# Patient Record
Sex: FEMALE | URBAN - METROPOLITAN AREA
[De-identification: names, ages, dates, MRNs, and addresses within clinical notes are randomized per-mention and may not be internally consistent; named-entity substitution may affect disease eponyms.]

---

## 2018-05-14 ENCOUNTER — APPOINTMENT (RX ONLY)
Dept: URBAN - METROPOLITAN AREA CLINIC 20 | Facility: CLINIC | Age: 73
Setting detail: DERMATOLOGY
End: 2018-05-14

## 2018-05-14 DIAGNOSIS — L57.0 ACTINIC KERATOSIS: ICD-10-CM

## 2018-05-14 DIAGNOSIS — F42.4 EXCORIATION (SKIN-PICKING) DISORDER: ICD-10-CM

## 2018-05-14 DIAGNOSIS — L20.89 OTHER ATOPIC DERMATITIS: ICD-10-CM

## 2018-05-14 PROBLEM — T14.8XXA OTHER INJURY OF UNSPECIFIED BODY REGION, INITIAL ENCOUNTER: Status: ACTIVE | Noted: 2018-05-14

## 2018-05-14 PROBLEM — L20.84 INTRINSIC (ALLERGIC) ECZEMA: Status: ACTIVE | Noted: 2018-05-14

## 2018-05-14 PROCEDURE — ? PRESCRIPTION

## 2018-05-14 PROCEDURE — 99202 OFFICE O/P NEW SF 15 MIN: CPT | Mod: 25

## 2018-05-14 PROCEDURE — ? COUNSELING

## 2018-05-14 PROCEDURE — 17003 DESTRUCT PREMALG LES 2-14: CPT

## 2018-05-14 PROCEDURE — 17000 DESTRUCT PREMALG LESION: CPT

## 2018-05-14 PROCEDURE — ? LIQUID NITROGEN

## 2018-05-14 RX ORDER — CERAMIDES 1,3,6-II
CREAM (GRAM) TOPICAL
Qty: 1 | Refills: 11

## 2018-05-14 ASSESSMENT — LOCATION ZONE DERM
LOCATION ZONE: SCALP
LOCATION ZONE: HAND
LOCATION ZONE: FACE
LOCATION ZONE: ARM
LOCATION ZONE: LEG
LOCATION ZONE: TRUNK

## 2018-05-14 ASSESSMENT — LOCATION SIMPLE DESCRIPTION DERM
LOCATION SIMPLE: RIGHT HAND
LOCATION SIMPLE: CHEST
LOCATION SIMPLE: HAIR
LOCATION SIMPLE: RIGHT FOREHEAD
LOCATION SIMPLE: LEFT FOREHEAD
LOCATION SIMPLE: LEFT KNEE
LOCATION SIMPLE: RIGHT PRETIBIAL REGION
LOCATION SIMPLE: LEFT SHOULDER

## 2018-05-14 ASSESSMENT — LOCATION DETAILED DESCRIPTION DERM
LOCATION DETAILED: UPPER STERNUM
LOCATION DETAILED: LEFT POSTERIOR SHOULDER
LOCATION DETAILED: HAIR
LOCATION DETAILED: LEFT KNEE
LOCATION DETAILED: RIGHT RADIAL DORSAL HAND
LOCATION DETAILED: RIGHT LATERAL PROXIMAL PRETIBIAL REGION
LOCATION DETAILED: LEFT INFERIOR LATERAL FOREHEAD
LOCATION DETAILED: LEFT INFERIOR MEDIAL FOREHEAD
LOCATION DETAILED: RIGHT FOREHEAD

## 2018-05-14 NOTE — PROCEDURE: COUNSELING
Patient Specific Counseling (Will Not Stick From Patient To Patient): Only froze a few today to see how she tolerates it.
Detail Level: Generalized
Detail Level: Zone
Patient Specific Counseling (Will Not Stick From Patient To Patient): Pt does not want to use Rx medications to treat this if she can help it.  Keep superficial BCC in mind for L shoulder area if not improved.  Only began to itch in the last week or so.

## 2018-05-14 NOTE — PROCEDURE: LIQUID NITROGEN
Consent: The patient's consent was obtained including but not limited to risks of crusting, scabbing, blistering, scarring, darker or lighter pigmentary change, recurrence, incomplete removal and infection.
Render Post-Care Instructions In Note?: yes
Detail Level: Detailed
Post-Care Instructions: I reviewed with the patient in detail post-care instructions. Patient is to wear sun protection and avoid picking at any of the treated lesions. Pt may apply Vaseline to crusted or scabbing areas.
Duration Of Freeze Thaw-Cycle (Seconds): 0

## 2018-06-04 ENCOUNTER — APPOINTMENT (RX ONLY)
Dept: URBAN - METROPOLITAN AREA CLINIC 20 | Facility: CLINIC | Age: 73
Setting detail: DERMATOLOGY
End: 2018-06-04

## 2018-06-04 DIAGNOSIS — L20.89 OTHER ATOPIC DERMATITIS: ICD-10-CM

## 2018-06-04 DIAGNOSIS — L57.0 ACTINIC KERATOSIS: ICD-10-CM

## 2018-06-04 PROBLEM — L20.84 INTRINSIC (ALLERGIC) ECZEMA: Status: ACTIVE | Noted: 2018-06-04

## 2018-06-04 PROCEDURE — 17004 DESTROY PREMAL LESIONS 15/>: CPT

## 2018-06-04 PROCEDURE — ? LIQUID NITROGEN

## 2018-06-04 PROCEDURE — 99213 OFFICE O/P EST LOW 20 MIN: CPT | Mod: 25

## 2018-06-04 PROCEDURE — ? PRESCRIPTION

## 2018-06-04 PROCEDURE — ? COUNSELING

## 2018-06-04 RX ORDER — CERAMIDES 1,3,6-II
CREAM (GRAM) TOPICAL
Qty: 1 | Refills: 11

## 2018-06-04 RX ORDER — TRIAMCINOLONE ACETONIDE 1 MG/G
CREAM TOPICAL
Qty: 1 | Refills: 1

## 2018-06-04 ASSESSMENT — LOCATION SIMPLE DESCRIPTION DERM
LOCATION SIMPLE: LEFT HAND
LOCATION SIMPLE: RIGHT THUMB
LOCATION SIMPLE: RIGHT CHEEK
LOCATION SIMPLE: LEFT TEMPLE
LOCATION SIMPLE: LEFT ANTERIOR NECK
LOCATION SIMPLE: LEFT CHEEK
LOCATION SIMPLE: LEFT FOREARM
LOCATION SIMPLE: RIGHT WRIST
LOCATION SIMPLE: RIGHT CLAVICULAR SKIN
LOCATION SIMPLE: RIGHT HAND
LOCATION SIMPLE: LEFT INDEX FINGER
LOCATION SIMPLE: CHEST
LOCATION SIMPLE: RIGHT INDEX FINGER
LOCATION SIMPLE: LEFT SHOULDER

## 2018-06-04 ASSESSMENT — LOCATION DETAILED DESCRIPTION DERM
LOCATION DETAILED: LEFT CLAVICULAR NECK
LOCATION DETAILED: RIGHT CLAVICULAR SKIN
LOCATION DETAILED: LEFT CENTRAL MALAR CHEEK
LOCATION DETAILED: UPPER STERNUM
LOCATION DETAILED: RIGHT LATERAL MALAR CHEEK
LOCATION DETAILED: RIGHT VENTRAL WRIST
LOCATION DETAILED: LEFT ULNAR DORSAL HAND
LOCATION DETAILED: LEFT INFERIOR LATERAL NECK
LOCATION DETAILED: LEFT RADIAL DORSAL HAND
LOCATION DETAILED: RIGHT RADIAL DORSAL HAND
LOCATION DETAILED: RIGHT PROXIMAL DORSAL INDEX FINGER
LOCATION DETAILED: LEFT POSTERIOR SHOULDER
LOCATION DETAILED: LEFT INFERIOR TEMPLE
LOCATION DETAILED: LEFT DISTAL DORSAL FOREARM
LOCATION DETAILED: LEFT PROXIMAL DORSAL INDEX FINGER
LOCATION DETAILED: STERNAL NOTCH
LOCATION DETAILED: 1ST WEB SPACE RIGHT HAND

## 2018-06-04 ASSESSMENT — LOCATION ZONE DERM
LOCATION ZONE: TRUNK
LOCATION ZONE: FACE
LOCATION ZONE: ARM
LOCATION ZONE: HAND
LOCATION ZONE: NECK
LOCATION ZONE: FINGER

## 2018-06-04 NOTE — PROCEDURE: COUNSELING
Detail Level: Generalized
Patient Specific Counseling (Will Not Stick From Patient To Patient): L posterior shoulder area looks like eczema today not superficial BCC.  Continue Aveeno BID.
Detail Level: Zone

## 2018-06-04 NOTE — PROCEDURE: LIQUID NITROGEN
Render Post-Care Instructions In Note?: yes
Post-Care Instructions: I reviewed with the patient in detail post-care instructions. Patient is to wear sun protection and avoid picking at any of the treated lesions. Pt may apply Vaseline to crusted or scabbing areas.
Duration Of Freeze Thaw-Cycle (Seconds): 0
Consent: The patient's consent was obtained including but not limited to risks of crusting, scabbing, blistering, scarring, darker or lighter pigmentary change, recurrence, incomplete removal and infection.
Detail Level: Detailed

## 2018-07-13 ENCOUNTER — APPOINTMENT (RX ONLY)
Dept: URBAN - METROPOLITAN AREA CLINIC 20 | Facility: CLINIC | Age: 73
Setting detail: DERMATOLOGY
End: 2018-07-13

## 2018-07-13 DIAGNOSIS — L20.89 OTHER ATOPIC DERMATITIS: ICD-10-CM

## 2018-07-13 DIAGNOSIS — L57.0 ACTINIC KERATOSIS: ICD-10-CM

## 2018-07-13 DIAGNOSIS — L01.01 NON-BULLOUS IMPETIGO: ICD-10-CM

## 2018-07-13 PROBLEM — L20.84 INTRINSIC (ALLERGIC) ECZEMA: Status: ACTIVE | Noted: 2018-07-13

## 2018-07-13 PROCEDURE — 11101: CPT

## 2018-07-13 PROCEDURE — ? PRESCRIPTION MEDICATION MANAGEMENT

## 2018-07-13 PROCEDURE — ? PRESCRIPTION

## 2018-07-13 PROCEDURE — 99213 OFFICE O/P EST LOW 20 MIN: CPT | Mod: 25

## 2018-07-13 PROCEDURE — ? BIOPSY BY PUNCH METHOD

## 2018-07-13 PROCEDURE — ? COUNSELING

## 2018-07-13 PROCEDURE — 11100: CPT

## 2018-07-13 RX ORDER — MUPIROCIN 20 MG/G
OINTMENT TOPICAL
Qty: 1 | Refills: 1

## 2018-07-13 ASSESSMENT — LOCATION ZONE DERM
LOCATION ZONE: ARM
LOCATION ZONE: FACE
LOCATION ZONE: FACE
LOCATION ZONE: TRUNK

## 2018-07-13 ASSESSMENT — LOCATION DETAILED DESCRIPTION DERM
LOCATION DETAILED: UPPER STERNUM
LOCATION DETAILED: LEFT MEDIAL SUPERIOR CHEST
LOCATION DETAILED: LEFT MEDIAL FOREHEAD
LOCATION DETAILED: LEFT SUPERIOR MEDIAL BUCCAL CHEEK
LOCATION DETAILED: LEFT POSTERIOR SHOULDER

## 2018-07-13 ASSESSMENT — LOCATION SIMPLE DESCRIPTION DERM
LOCATION SIMPLE: LEFT SHOULDER
LOCATION SIMPLE: LEFT CHEEK
LOCATION SIMPLE: LEFT FOREHEAD
LOCATION SIMPLE: CHEST

## 2018-07-13 NOTE — PROCEDURE: PRESCRIPTION MEDICATION MANAGEMENT
Continue Regimen: triamcinolone acetonide 0.1 % topical cream TP Sig: Apply to eczema on body BID when flared
Detail Level: Zone

## 2018-07-13 NOTE — PROCEDURE: BIOPSY BY PUNCH METHOD
Anesthesia Type: 1% lidocaine with 1:100,000 epinephrine and a 1:10 solution of 8.4% sodium bicarbonate
Billing Type: Third-Party Bill
Dressing: bandage
Post-Care Instructions: Keep the biopsy site dry overnight, then keep the site clean by washing with soap and water twice daily then covering with Vaseline/Aquaphor and a Band-Aid until healed.
Render Post-Care Instructions In Note?: yes
Size Of Lesion In Cm (Optional): 0
Consent: Written consent was obtained and risks were reviewed including but not limited to scarring, infection, bleeding, scabbing, incomplete removal, nerve damage and allergy to anesthesia.
Notification Instructions: Patient will be notified of biopsy results; however, patient is instructed to call the office if not contacted within 2 weeks.
Detail Level: Detailed
Punch Size In Mm: 3.5
Patient Will Remove Sutures At Home?: No
Hemostasis: Electrocautery
Suture Removal: 14 days
Lab Facility: [id number]
Anesthesia Volume In Cc: 0.5
Home Suture Removal Text: Patient will come into the office for suture removal.
Wound Care: Aquaphor
Biopsy Type: H and E
Epidermal Sutures: 4-0 Monosof
Lab: [id number]

## 2018-07-13 NOTE — PROCEDURE: COUNSELING
Detail Level: Generalized
Detail Level: Zone
Patient Specific Counseling (Will Not Stick From Patient To Patient): Pt states that this itchy/painful rash has been present x 4 years.  Will biopsy to confirm dx since not improving.  Continue Eucerin BID.

## 2018-07-25 ENCOUNTER — APPOINTMENT (RX ONLY)
Dept: URBAN - METROPOLITAN AREA CLINIC 20 | Facility: CLINIC | Age: 73
Setting detail: DERMATOLOGY
End: 2018-07-25

## 2018-07-25 DIAGNOSIS — L01.01 NON-BULLOUS IMPETIGO: ICD-10-CM

## 2018-07-25 PROBLEM — D04.62 CARCINOMA IN SITU OF SKIN OF LEFT UPPER LIMB, INCLUDING SHOULDER: Status: ACTIVE | Noted: 2018-07-25

## 2018-07-25 PROBLEM — Z85.828 PERSONAL HISTORY OF OTHER MALIGNANT NEOPLASM OF SKIN: Status: ACTIVE | Noted: 2018-07-25

## 2018-07-25 PROBLEM — D04.5 CARCINOMA IN SITU OF SKIN OF TRUNK: Status: ACTIVE | Noted: 2018-07-25

## 2018-07-25 PROCEDURE — ? COUNSELING

## 2018-07-25 PROCEDURE — ? SUTURE REMOVAL (NO GLOBAL PERIOD)

## 2018-07-25 PROCEDURE — 99213 OFFICE O/P EST LOW 20 MIN: CPT

## 2018-07-25 PROCEDURE — ? PRESCRIPTION MEDICATION MANAGEMENT

## 2018-07-25 ASSESSMENT — LOCATION SIMPLE DESCRIPTION DERM
LOCATION SIMPLE: LEFT CHEEK
LOCATION SIMPLE: LEFT CHEEK

## 2018-07-25 ASSESSMENT — LOCATION DETAILED DESCRIPTION DERM
LOCATION DETAILED: LEFT SUPERIOR MEDIAL BUCCAL CHEEK
LOCATION DETAILED: LEFT INFERIOR MEDIAL MALAR CHEEK

## 2018-07-25 ASSESSMENT — LOCATION ZONE DERM
LOCATION ZONE: FACE
LOCATION ZONE: FACE

## 2018-07-25 NOTE — PROCEDURE: PRESCRIPTION MEDICATION MANAGEMENT
Discontinue Regimen: mupirocin 2 % topical ointment TP Sig: Apply to affected wounds on body BID until healed
Detail Level: Zone

## 2018-07-25 NOTE — PROCEDURE: COUNSELING
Detail Level: Detailed
Patient Specific Counseling (Will Not Stick From Patient To Patient): These 2 locations were previously called eczema and they are itchy and painful and have been present x 4 years.  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  Previously discussed result with Dr. Howell and due to the size of the chest site I offered 5-FU first but pt declined today since she doesn’t like to take medications.   I offered her the other treatments of C&D and excision and she prefers C&D and is ok with the scarring in those locations.
Detail Level: Zone

## 2018-08-08 ENCOUNTER — APPOINTMENT (RX ONLY)
Dept: URBAN - METROPOLITAN AREA CLINIC 20 | Facility: CLINIC | Age: 73
Setting detail: DERMATOLOGY
End: 2018-08-08

## 2018-08-08 PROBLEM — D04.62 CARCINOMA IN SITU OF SKIN OF LEFT UPPER LIMB, INCLUDING SHOULDER: Status: ACTIVE | Noted: 2018-08-08

## 2018-08-08 PROBLEM — D04.5 CARCINOMA IN SITU OF SKIN OF TRUNK: Status: ACTIVE | Noted: 2018-08-08

## 2018-08-08 PROCEDURE — 17266 DSTRJ MAL LES T/A/L >4.0 CM: CPT

## 2018-08-08 PROCEDURE — ? COUNSELING

## 2018-08-08 PROCEDURE — 17263 DSTRJ MAL LES T/A/L 2.1-3.0: CPT

## 2018-08-08 PROCEDURE — ? CURETTAGE AND DESTRUCTION

## 2018-08-08 NOTE — PROCEDURE: CURETTAGE AND DESTRUCTION
Detail Level: Detailed
Number Of Curettages: 3
Additional Information: (Optional): The wound was cleaned and a dressing was applied.
Concentration (Mg/Ml Or Millions Of Plaque Forming Units/Cc): 0.01
Add Ability To Document Additional Intralesional Injection: No
Post-Care Instructions: I reviewed with the patient in detail the post-care instructions. Patient is to keep the area dry for 24 hours.  Patent should keep the area clean with soap and water and apply Vaseline and a bandage BID until healed.  Should the patient develop any fevers, chills, bleeding or severe pain they will contact the office immediately.
Anesthesia Volume In Cc: 1
Render Post-Care Instructions In Note?: yes
Size Of Lesion After Curettage: 4.7
Anesthesia Type: 1% lidocaine with 1:100,000 epinephrine and 408mcg clindamycin/ml and a 1:10 solution of 8.4% sodium bicarbonate
Consent was obtained from the patient. The risks, benefits and alternatives to therapy were discussed in detail. Specifically, the risks of infection, scarring, bleeding, prolonged wound healing, nerve injury, incomplete removal, allergy to anesthesia and recurrence were addressed. Alternatives to ED&C were also discussed.  Prior to the procedure, the treatment site was clearly identified and confirmed by the patient. All components of Universal Protocol/PAUSE Rule completed.
Size Of Lesion In Cm: 3.9
Cautery Type: electrodesiccation
Bill As A Line Item Or As Units: Line Item
What Was Performed First?: Curettage
Size Of Lesion After Curettage: 2.6
Size Of Lesion In Cm: 1.8

## 2018-11-08 ENCOUNTER — APPOINTMENT (RX ONLY)
Dept: URBAN - METROPOLITAN AREA CLINIC 22 | Facility: CLINIC | Age: 73
Setting detail: DERMATOLOGY
End: 2018-11-08

## 2018-11-08 DIAGNOSIS — Z12.83 ENCOUNTER FOR SCREENING FOR MALIGNANT NEOPLASM OF SKIN: ICD-10-CM

## 2018-11-08 DIAGNOSIS — D18.0 HEMANGIOMA: ICD-10-CM

## 2018-11-08 DIAGNOSIS — L57.0 ACTINIC KERATOSIS: ICD-10-CM

## 2018-11-08 DIAGNOSIS — Z86.007 PERSONAL HISTORY OF IN-SITU NEOPLASM OF SKIN: ICD-10-CM

## 2018-11-08 DIAGNOSIS — L81.4 OTHER MELANIN HYPERPIGMENTATION: ICD-10-CM

## 2018-11-08 PROBLEM — D18.01 HEMANGIOMA OF SKIN AND SUBCUTANEOUS TISSUE: Status: ACTIVE | Noted: 2018-11-08

## 2018-11-08 PROBLEM — Z85.828 PERSONAL HISTORY OF OTHER MALIGNANT NEOPLASM OF SKIN: Status: ACTIVE | Noted: 2018-11-08

## 2018-11-08 PROCEDURE — 17004 DESTROY PREMAL LESIONS 15/>: CPT

## 2018-11-08 PROCEDURE — ? COUNSELING

## 2018-11-08 PROCEDURE — ? LIQUID NITROGEN

## 2018-11-08 PROCEDURE — 99214 OFFICE O/P EST MOD 30 MIN: CPT | Mod: 25

## 2018-11-08 ASSESSMENT — LOCATION DETAILED DESCRIPTION DERM
LOCATION DETAILED: RIGHT SUPERIOR HELIX
LOCATION DETAILED: SUPERIOR THORACIC SPINE
LOCATION DETAILED: LEFT INFERIOR LATERAL FOREHEAD
LOCATION DETAILED: LEFT MEDIAL FRONTAL SCALP
LOCATION DETAILED: LEFT MEDIAL BREAST 10-11:00 REGION
LOCATION DETAILED: RIGHT CENTRAL MALAR CHEEK
LOCATION DETAILED: RIGHT PROXIMAL POSTERIOR UPPER ARM
LOCATION DETAILED: RIGHT SUPERIOR LATERAL UPPER BACK
LOCATION DETAILED: RIGHT DISTAL DORSAL FOREARM
LOCATION DETAILED: LEFT ULNAR DORSAL HAND
LOCATION DETAILED: LEFT SUPERIOR LATERAL UPPER BACK
LOCATION DETAILED: LEFT SUPERIOR MEDIAL FOREHEAD
LOCATION DETAILED: LEFT INFERIOR ANTERIOR NECK
LOCATION DETAILED: RIGHT POSTERIOR ANKLE
LOCATION DETAILED: LEFT LATERAL EYEBROW
LOCATION DETAILED: LEFT POSTERIOR SHOULDER
LOCATION DETAILED: LEFT PROXIMAL POSTERIOR UPPER ARM
LOCATION DETAILED: LEFT LATERAL SUPERIOR CHEST
LOCATION DETAILED: MID-FRONTAL SCALP
LOCATION DETAILED: RIGHT SUPERIOR FOREHEAD
LOCATION DETAILED: LEFT SUPERIOR HELIX
LOCATION DETAILED: LEFT MEDIAL SUPERIOR CHEST
LOCATION DETAILED: RIGHT MEDIAL BREAST 1-2:00 REGION
LOCATION DETAILED: LEFT UPPER CUTANEOUS LIP
LOCATION DETAILED: RIGHT INFERIOR LATERAL FOREHEAD
LOCATION DETAILED: STERNAL NOTCH
LOCATION DETAILED: UPPER STERNUM
LOCATION DETAILED: LEFT FOREHEAD

## 2018-11-08 ASSESSMENT — LOCATION SIMPLE DESCRIPTION DERM
LOCATION SIMPLE: LEFT EAR
LOCATION SIMPLE: LEFT ANTERIOR NECK
LOCATION SIMPLE: RIGHT FOREARM
LOCATION SIMPLE: LEFT HAND
LOCATION SIMPLE: ANTERIOR SCALP
LOCATION SIMPLE: RIGHT ANKLE
LOCATION SIMPLE: LEFT EYEBROW
LOCATION SIMPLE: LEFT LIP
LOCATION SIMPLE: LEFT UPPER BACK
LOCATION SIMPLE: LEFT FOREHEAD
LOCATION SIMPLE: RIGHT BACK
LOCATION SIMPLE: RIGHT EAR
LOCATION SIMPLE: RIGHT CHEEK
LOCATION SIMPLE: UPPER BACK
LOCATION SIMPLE: LEFT POSTERIOR UPPER ARM
LOCATION SIMPLE: RIGHT POSTERIOR UPPER ARM
LOCATION SIMPLE: LEFT BREAST
LOCATION SIMPLE: CHEST
LOCATION SIMPLE: RIGHT BREAST
LOCATION SIMPLE: LEFT SHOULDER
LOCATION SIMPLE: LEFT SCALP
LOCATION SIMPLE: RIGHT FOREHEAD

## 2018-11-08 ASSESSMENT — LOCATION ZONE DERM
LOCATION ZONE: ARM
LOCATION ZONE: LEG
LOCATION ZONE: FACE
LOCATION ZONE: LIP
LOCATION ZONE: HAND
LOCATION ZONE: NECK
LOCATION ZONE: EAR
LOCATION ZONE: TRUNK
LOCATION ZONE: SCALP

## 2018-11-08 NOTE — PROCEDURE: COUNSELING
Detail Level: Generalized
Detail Level: Detailed
Patient Specific Counseling (Will Not Stick From Patient To Patient): SCCIS in these locations s/p C&D are resolved.  Massage to flatten areas a bit.
Detail Level: Zone

## 2018-11-08 NOTE — PROCEDURE: LIQUID NITROGEN
Render Post-Care Instructions In Note?: yes
Consent: The patient's consent was obtained including but not limited to risks of crusting, scabbing, blistering, scarring, darker or lighter pigmentary change, recurrence, incomplete removal and infection.
Detail Level: Detailed
Post-Care Instructions: I reviewed with the patient in detail post-care instructions. Patient is to wear sun protection and avoid picking at any of the treated lesions. Pt may apply Vaseline to crusted or scabbing areas.
Duration Of Freeze Thaw-Cycle (Seconds): 0

## 2019-02-05 ENCOUNTER — APPOINTMENT (RX ONLY)
Dept: URBAN - METROPOLITAN AREA CLINIC 22 | Facility: CLINIC | Age: 74
Setting detail: DERMATOLOGY
End: 2019-02-05

## 2019-02-05 DIAGNOSIS — L91.0 HYPERTROPHIC SCAR: ICD-10-CM

## 2019-02-05 DIAGNOSIS — L57.0 ACTINIC KERATOSIS: ICD-10-CM

## 2019-02-05 PROCEDURE — ? COUNSELING

## 2019-02-05 PROCEDURE — 17004 DESTROY PREMAL LESIONS 15/>: CPT

## 2019-02-05 PROCEDURE — 99213 OFFICE O/P EST LOW 20 MIN: CPT | Mod: 25

## 2019-02-05 PROCEDURE — ? LIQUID NITROGEN

## 2019-02-05 ASSESSMENT — LOCATION ZONE DERM
LOCATION ZONE: FACE
LOCATION ZONE: HAND
LOCATION ZONE: NECK
LOCATION ZONE: TRUNK
LOCATION ZONE: ARM
LOCATION ZONE: EAR
LOCATION ZONE: SCALP

## 2019-02-05 ASSESSMENT — LOCATION DETAILED DESCRIPTION DERM
LOCATION DETAILED: RIGHT POSTERIOR SHOULDER
LOCATION DETAILED: RIGHT INFERIOR FOREHEAD
LOCATION DETAILED: LEFT CLAVICULAR SKIN
LOCATION DETAILED: RIGHT SUPERIOR LATERAL UPPER BACK
LOCATION DETAILED: LEFT INFERIOR MEDIAL FOREHEAD
LOCATION DETAILED: RIGHT FOREHEAD
LOCATION DETAILED: LEFT INFERIOR LATERAL FOREHEAD
LOCATION DETAILED: RIGHT SUPERIOR FOREHEAD
LOCATION DETAILED: LEFT POSTERIOR SHOULDER
LOCATION DETAILED: LEFT INFERIOR ANTERIOR NECK
LOCATION DETAILED: LEFT MEDIAL SUPERIOR CHEST
LOCATION DETAILED: RIGHT INFERIOR LATERAL FOREHEAD
LOCATION DETAILED: RIGHT INFERIOR LATERAL NECK
LOCATION DETAILED: LEFT SUPERIOR POSTERIOR HELIX
LOCATION DETAILED: RIGHT DORSAL INDEX METACARPOPHALANGEAL JOINT
LOCATION DETAILED: RIGHT DISTAL DORSAL FOREARM
LOCATION DETAILED: RIGHT LATERAL FOREHEAD
LOCATION DETAILED: LEFT SUPERIOR PARIETAL SCALP
LOCATION DETAILED: LEFT LATERAL SUPERIOR CHEST
LOCATION DETAILED: RIGHT RADIAL DORSAL HAND
LOCATION DETAILED: RIGHT SUPERIOR HELIX
LOCATION DETAILED: RIGHT SUPERIOR UPPER BACK
LOCATION DETAILED: LEFT MEDIAL FRONTAL SCALP

## 2019-02-05 ASSESSMENT — LOCATION SIMPLE DESCRIPTION DERM
LOCATION SIMPLE: LEFT EAR
LOCATION SIMPLE: RIGHT UPPER BACK
LOCATION SIMPLE: RIGHT BACK
LOCATION SIMPLE: CHEST
LOCATION SIMPLE: LEFT CLAVICULAR SKIN
LOCATION SIMPLE: LEFT SHOULDER
LOCATION SIMPLE: RIGHT ANTERIOR NECK
LOCATION SIMPLE: RIGHT EAR
LOCATION SIMPLE: RIGHT HAND
LOCATION SIMPLE: RIGHT FOREHEAD
LOCATION SIMPLE: LEFT ANTERIOR NECK
LOCATION SIMPLE: LEFT FOREHEAD
LOCATION SIMPLE: SCALP
LOCATION SIMPLE: RIGHT SHOULDER
LOCATION SIMPLE: RIGHT FOREARM
LOCATION SIMPLE: LEFT SCALP

## 2019-02-05 NOTE — PROCEDURE: LIQUID NITROGEN
Post-Care Instructions: I reviewed with the patient in detail post-care instructions. Patient is to wear sun protection and avoid picking at any of the treated lesions. Pt may apply Vaseline to crusted or scabbing areas.
Duration Of Freeze Thaw-Cycle (Seconds): 0
Consent: The patient's consent was obtained including but not limited to risks of crusting, scabbing, blistering, scarring, darker or lighter pigmentary change, recurrence, incomplete removal and infection.
Detail Level: Detailed
Render Post-Care Instructions In Note?: yes

## 2019-02-05 NOTE — PROCEDURE: COUNSELING
Patient Specific Counseling (Will Not Stick From Patient To Patient): Discussed PDT for the future briefly if we get the machine.
Detail Level: Generalized
Detail Level: Detailed
Patient Specific Counseling (Will Not Stick From Patient To Patient): From previous C&Ds of SCCIS.  Areas are itchy, slightly painful and tight.  Pt prefers to continue massage and her OTC oil.  ILK is a future option.

## 2021-01-15 DIAGNOSIS — Z23 NEED FOR VACCINATION: ICD-10-CM

## 2023-09-25 ENCOUNTER — HOSPITAL ENCOUNTER (OUTPATIENT)
Dept: RADIOLOGY | Facility: MEDICAL CENTER | Age: 78
End: 2023-09-25
Attending: PHYSICIAN ASSISTANT
Payer: COMMERCIAL

## 2023-09-25 ENCOUNTER — HOSPITAL ENCOUNTER (OUTPATIENT)
Dept: RADIOLOGY | Facility: MEDICAL CENTER | Age: 78
End: 2023-09-25
Payer: COMMERCIAL

## 2023-11-08 ENCOUNTER — OFFICE VISIT (OUTPATIENT)
Dept: SLEEP MEDICINE | Facility: MEDICAL CENTER | Age: 78
End: 2023-11-08
Attending: INTERNAL MEDICINE
Payer: COMMERCIAL

## 2023-11-08 VITALS
HEART RATE: 86 BPM | WEIGHT: 104.5 LBS | BODY MASS INDEX: 21.07 KG/M2 | HEIGHT: 59 IN | OXYGEN SATURATION: 94 % | SYSTOLIC BLOOD PRESSURE: 120 MMHG | DIASTOLIC BLOOD PRESSURE: 70 MMHG

## 2023-11-08 DIAGNOSIS — J47.9 BRONCHIECTASIS WITHOUT COMPLICATION (HCC): ICD-10-CM

## 2023-11-08 DIAGNOSIS — R93.89 ABNORMAL CT OF THE CHEST: ICD-10-CM

## 2023-11-08 PROCEDURE — 3074F SYST BP LT 130 MM HG: CPT | Performed by: INTERNAL MEDICINE

## 2023-11-08 PROCEDURE — 99204 OFFICE O/P NEW MOD 45 MIN: CPT | Performed by: INTERNAL MEDICINE

## 2023-11-08 PROCEDURE — 3078F DIAST BP <80 MM HG: CPT | Performed by: INTERNAL MEDICINE

## 2023-11-08 PROCEDURE — 99212 OFFICE O/P EST SF 10 MIN: CPT | Performed by: INTERNAL MEDICINE

## 2023-11-08 RX ORDER — ALBUTEROL SULFATE 2.5 MG/3ML
2.5 SOLUTION RESPIRATORY (INHALATION) EVERY 4 HOURS PRN
Qty: 120 ML | Refills: 11 | Status: SHIPPED | OUTPATIENT
Start: 2023-11-08 | End: 2023-11-08

## 2023-11-08 RX ORDER — ALBUTEROL SULFATE 90 UG/1
2 AEROSOL, METERED RESPIRATORY (INHALATION) EVERY 4 HOURS PRN
COMMUNITY
Start: 2023-05-19

## 2023-11-08 RX ORDER — ALBUTEROL SULFATE 2.5 MG/3ML
2.5 SOLUTION RESPIRATORY (INHALATION) EVERY 4 HOURS PRN
Qty: 120 ML | Refills: 11 | Status: SHIPPED
Start: 2023-11-08

## 2023-11-08 ASSESSMENT — ENCOUNTER SYMPTOMS
NAUSEA: 0
HEADACHES: 0
PND: 0
EYE REDNESS: 0
HEMOPTYSIS: 0
DIARRHEA: 0
SPUTUM PRODUCTION: 1
SHORTNESS OF BREATH: 0
BLURRED VISION: 0
ABDOMINAL PAIN: 0
WHEEZING: 0
VOMITING: 0
FEVER: 0
BACK PAIN: 0
SINUS PAIN: 0
FOCAL WEAKNESS: 0
EYE PAIN: 0
DIZZINESS: 0
EYE DISCHARGE: 0
CLAUDICATION: 0
DEPRESSION: 0
CHILLS: 0
DOUBLE VISION: 0
TREMORS: 0
PALPITATIONS: 0
ORTHOPNEA: 0
SORE THROAT: 0
HEARTBURN: 1
WEAKNESS: 0
CONSTIPATION: 0
FALLS: 0
WEIGHT LOSS: 1
PHOTOPHOBIA: 0
SPEECH CHANGE: 0
NECK PAIN: 0
COUGH: 1
DIAPHORESIS: 0
MYALGIAS: 0
STRIDOR: 0

## 2023-11-08 ASSESSMENT — PATIENT HEALTH QUESTIONNAIRE - PHQ9: CLINICAL INTERPRETATION OF PHQ2 SCORE: 0

## 2023-11-08 NOTE — PROGRESS NOTES
Chief Complaint   Patient presents with    New Patient     REF BY ADA KANG PA-C FOR PNEUMONIA    Results     CXR 8/3/23  CT CHEST 7/10/23, 5/11/23        HPI: This patient is a 77 y.o. female presenting for evaluation of abnormal CT chest and cough. 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 She smoked socially but a total of 4 packs total in 20 years, never a daily smoker and tobacco free for 30 years. She has a daughter with severe asthma. No family hx of autoimmune disease or CF.  She has no pets at home. She was last hospitalized for severe pna in 2009. Per pt she had no pulmonary complaints for the last 5-6 years until 7/2022 [facility]epigastric [facility]. CT abdomen showed some RLL GGO which pt states has been abnormal for many years and often thought to be the source of her pna. She was tx with abx for possible pna although denies respiratory complaints at the time. She was referred to pulmonary and saw [facility] Nav[facility]rete [facility] who she says started her on NAC orally. She also apparently had sputum clx x2 that showed no pathologic bacteria, not clear if were held for AFB. She stopped the NAC after 3 mos due to side effects of new cough and airway dryness then in 1/2023 she developed severe cough with copious green sputum which has persisted over the past 10 months. She has been seen in ED on multiple occasions for upper chest pain which is similar to prior episodes of pna in the past. She has also had 20-25 lb wt loss over the past 16 mos. .    No past medical history on file.    Social History     Socioeconomic History    Marital status: Single     Spouse name: Not on file    Number of children: Not on file    Years of education: Not on file    Highest education level: Not on file   Occupational History    Not on file   Tobacco Use    Smoking status: Not on file     Smokeless tobacco: Not on file   Substance and Sexual Activity    Alcohol use: Not on file    Drug use: Not on file    Sexual activity: Not on file   Other Topics Concern    Not on file   Social History Narrative    Not on file     Social Determinants of Health     Financial Resource Strain: Not on file   Food Insecurity: Not on file   Transportation Needs: Not on file   Physical Activity: Not on file   Stress: Not on file   Social Connections: Not on file   Intimate Partner Violence: Not on file   Housing Stability: Not on file       No family history on file.    Current Outpatient Medications on File Prior to Visit   Medication Sig Dispense Refill    albuterol 108 (90 Base) MCG/ACT Aero Soln inhalation aerosol Inhale 2 Puffs every four hours as needed.       No current facility-administered medications on file prior to visit.       Allergies: Codeine, Diagnostic x-ray materials, Erythromycin, and Moxifloxacin    ROS:   Review of Systems   Constitutional:  Positive for weight loss. Negative for chills, diaphoresis, fever and malaise/fatigue.   HENT:  Positive for congestion. Negative for ear discharge, ear pain, hearing loss, nosebleeds, sinus pain, sore throat and tinnitus.    Eyes:  Negative for blurred vision, double vision, photophobia, pain, discharge and redness.   Respiratory:  Positive for cough and sputum production. Negative for hemoptysis, shortness of breath, wheezing and stridor.    Cardiovascular:  Negative for chest pain, palpitations, orthopnea, claudication, leg swelling and PND.   Gastrointestinal:  Positive for heartburn. Negative for abdominal pain, constipation, diarrhea, nausea and vomiting.   Genitourinary:  Negative for dysuria and urgency.   Musculoskeletal:  Negative for back pain, falls, joint pain, myalgias and neck pain.   Skin:  Negative for itching and rash.   Neurological:  Negative for dizziness, tremors, speech change, focal weakness, weakness and headaches.   Endo/Heme/Allergies:   "Negative for environmental allergies.   Psychiatric/Behavioral:  Negative for depression.        /70 (BP Location: Right arm, Patient Position: Sitting, BP Cuff Size: Adult)   Pulse 86   Ht 1.499 m (4' 11\")   Wt 47.4 kg (104 lb 8 oz)   SpO2 94%     Physical Exam:  Physical Exam  Constitutional:       General: She is not in acute distress.     Appearance: Normal appearance. She is well-developed and normal weight.   HENT:      Head: Normocephalic and atraumatic.      Right Ear: External ear normal.      Left Ear: External ear normal.      Nose: Nose normal. No congestion.      Mouth/Throat:      Mouth: Mucous membranes are moist.      Pharynx: Oropharynx is clear. No oropharyngeal exudate.   Eyes:      General: No scleral icterus.     Extraocular Movements: Extraocular movements intact.      Conjunctiva/sclera: Conjunctivae normal.      Pupils: Pupils are equal, round, and reactive to light.   Neck:      Vascular: No JVD.      Trachea: No tracheal deviation.   Cardiovascular:      Rate and Rhythm: Normal rate and regular rhythm.      Heart sounds: Normal heart sounds. No murmur heard.     No friction rub. No gallop.   Pulmonary:      Effort: Pulmonary effort is normal. No accessory muscle usage or respiratory distress.      Breath sounds: Normal breath sounds. No wheezing or rales.   Abdominal:      General: There is no distension.      Palpations: Abdomen is soft.      Tenderness: There is no abdominal tenderness.   Musculoskeletal:         General: No tenderness or deformity. Normal range of motion.      Cervical back: Normal range of motion and neck supple.      Right lower leg: No edema.      Left lower leg: No edema.   Lymphadenopathy:      Cervical: No cervical adenopathy.   Skin:     General: Skin is warm and dry.      Findings: No rash.      Nails: There is no clubbing.   Neurological:      Mental Status: She is alert and oriented to person, place, and time.      Cranial Nerves: No cranial nerve " deficit.      Gait: Gait normal.   Psychiatric:         Behavior: Behavior normal.       PFTs as reviewed by me personally:none    Imaging as reviewed by me personally:as per hPI    Assessment:  1. Bronchiectasis without complication (HCC)  CULTURE RESPIRATORY W/ GRM STN    AFB Culture    AFB Culture    AFB Culture    DME Nebulizer    PULMONARY FUNCTION TESTS -Test requested: Complete Pulmonary Function Test    IMMUNOGLOBULINS A/E/G/M, SERUM    CBC WITH DIFFERENTIAL    SWEAT CHLORIDE    DME Nebulizer    DME Other    albuterol (PROVENTIL) 2.5mg/3ml Nebu Soln solution for nebulization    DISCONTINUED: albuterol (PROVENTIL) 2.5mg/3ml Nebu Soln solution for nebulization      2. Abnormal CT of the chest  CULTURE RESPIRATORY W/ GRM STN    AFB Culture    AFB Culture    AFB Culture    DME Nebulizer    PULMONARY FUNCTION TESTS -Test requested: Complete Pulmonary Function Test    IMMUNOGLOBULINS A/E/G/M, SERUM    CBC WITH DIFFERENTIAL    SWEAT CHLORIDE    DME Nebulizer    albuterol (PROVENTIL) 2.5mg/3ml Nebu Soln solution for nebulization    DISCONTINUED: albuterol (PROVENTIL) 2.5mg/3ml Nebu Soln solution for nebulization          Plan:  This is chronic and may have been w/u in the past. Pattern not c/w something such as atypical or CF variant however I am not sure she has been r/o for NTM and no need to proceed straight to bronchoscopy if she can produce mucous. Will obtain sputum for routine bacterial and afb x3. Will obtain full PFT. Start nebulized albuterol and use of flutter valve for aiway clearance. Serum immunoglobulins, IgE level and sweat chrloride.   See discussion above.   Return in about 3 months (around 2/8/2024) for PFT any time, labs, sputum .

## 2023-11-09 ENCOUNTER — HOSPITAL ENCOUNTER (OUTPATIENT)
Facility: MEDICAL CENTER | Age: 78
End: 2023-11-09
Attending: INTERNAL MEDICINE
Payer: COMMERCIAL

## 2023-11-09 DIAGNOSIS — J47.9 BRONCHIECTASIS WITHOUT COMPLICATION (HCC): ICD-10-CM

## 2023-11-09 DIAGNOSIS — R93.89 ABNORMAL CT OF THE CHEST: ICD-10-CM

## 2023-11-09 LAB
GRAM STN SPEC: NORMAL
SIGNIFICANT IND 70042: NORMAL
SITE SITE: NORMAL
SOURCE SOURCE: NORMAL

## 2023-11-09 PROCEDURE — 87116 MYCOBACTERIA CULTURE: CPT

## 2023-11-09 PROCEDURE — 87205 SMEAR GRAM STAIN: CPT

## 2023-11-09 PROCEDURE — 87206 SMEAR FLUORESCENT/ACID STAI: CPT

## 2023-11-09 PROCEDURE — 87015 SPECIMEN INFECT AGNT CONCNTJ: CPT

## 2023-11-10 ENCOUNTER — HOSPITAL ENCOUNTER (OUTPATIENT)
Facility: MEDICAL CENTER | Age: 78
End: 2023-11-10
Attending: INTERNAL MEDICINE
Payer: COMMERCIAL

## 2023-11-10 DIAGNOSIS — J47.9 BRONCHIECTASIS WITHOUT COMPLICATION (HCC): ICD-10-CM

## 2023-11-10 DIAGNOSIS — R93.89 ABNORMAL CT OF THE CHEST: ICD-10-CM

## 2023-11-10 LAB
GRAM STN SPEC: NORMAL
RHODAMINE-AURAMINE STN SPEC: NORMAL
SIGNIFICANT IND 70042: NORMAL
SIGNIFICANT IND 70042: NORMAL
SITE SITE: NORMAL
SITE SITE: NORMAL
SOURCE SOURCE: NORMAL
SOURCE SOURCE: NORMAL

## 2023-11-10 PROCEDURE — 87206 SMEAR FLUORESCENT/ACID STAI: CPT

## 2023-11-10 PROCEDURE — 87116 MYCOBACTERIA CULTURE: CPT

## 2023-11-10 PROCEDURE — 87205 SMEAR GRAM STAIN: CPT

## 2023-11-10 PROCEDURE — 87070 CULTURE OTHR SPECIMN AEROBIC: CPT

## 2023-11-10 PROCEDURE — 87015 SPECIMEN INFECT AGNT CONCNTJ: CPT

## 2023-11-11 LAB
RHODAMINE-AURAMINE STN SPEC: NORMAL
SIGNIFICANT IND 70042: NORMAL
SITE SITE: NORMAL
SOURCE SOURCE: NORMAL

## 2023-11-12 LAB
BACTERIA SPEC RESP CULT: NORMAL
GRAM STN SPEC: NORMAL
SIGNIFICANT IND 70042: NORMAL
SITE SITE: NORMAL
SOURCE SOURCE: NORMAL

## 2023-11-13 ENCOUNTER — HOSPITAL ENCOUNTER (OUTPATIENT)
Dept: LAB | Facility: MEDICAL CENTER | Age: 78
End: 2023-11-13
Attending: INTERNAL MEDICINE
Payer: COMMERCIAL

## 2023-11-13 DIAGNOSIS — J47.9 BRONCHIECTASIS WITHOUT COMPLICATION (HCC): ICD-10-CM

## 2023-11-13 DIAGNOSIS — R93.89 ABNORMAL CT OF THE CHEST: ICD-10-CM

## 2023-11-13 LAB
BASOPHILS # BLD AUTO: 0.3 % (ref 0–1.8)
BASOPHILS # BLD: 0.04 K/UL (ref 0–0.12)
EOSINOPHIL # BLD AUTO: 0.04 K/UL (ref 0–0.51)
EOSINOPHIL NFR BLD: 0.3 % (ref 0–6.9)
ERYTHROCYTE [DISTWIDTH] IN BLOOD BY AUTOMATED COUNT: 46.4 FL (ref 35.9–50)
HCT VFR BLD AUTO: 43.8 % (ref 37–47)
HGB BLD-MCNC: 13.2 G/DL (ref 12–16)
IMM GRANULOCYTES # BLD AUTO: 0.04 K/UL (ref 0–0.11)
IMM GRANULOCYTES NFR BLD AUTO: 0.3 % (ref 0–0.9)
LYMPHOCYTES # BLD AUTO: 2.08 K/UL (ref 1–4.8)
LYMPHOCYTES NFR BLD: 15 % (ref 22–41)
MCH RBC QN AUTO: 28.4 PG (ref 27–33)
MCHC RBC AUTO-ENTMCNC: 30.1 G/DL (ref 32.2–35.5)
MCV RBC AUTO: 94.2 FL (ref 81.4–97.8)
MONOCYTES # BLD AUTO: 0.8 K/UL (ref 0–0.85)
MONOCYTES NFR BLD AUTO: 5.8 % (ref 0–13.4)
NEUTROPHILS # BLD AUTO: 10.88 K/UL (ref 1.82–7.42)
NEUTROPHILS NFR BLD: 78.3 % (ref 44–72)
NRBC # BLD AUTO: 0 K/UL
NRBC BLD-RTO: 0 /100 WBC (ref 0–0.2)
PLATELET # BLD AUTO: 323 K/UL (ref 164–446)
PMV BLD AUTO: 10.9 FL (ref 9–12.9)
RBC # BLD AUTO: 4.65 M/UL (ref 4.2–5.4)
WBC # BLD AUTO: 13.9 K/UL (ref 4.8–10.8)

## 2023-11-13 PROCEDURE — 82784 ASSAY IGA/IGD/IGG/IGM EACH: CPT

## 2023-11-13 PROCEDURE — 87206 SMEAR FLUORESCENT/ACID STAI: CPT

## 2023-11-13 PROCEDURE — 82785 ASSAY OF IGE: CPT

## 2023-11-13 PROCEDURE — 85025 COMPLETE CBC W/AUTO DIFF WBC: CPT

## 2023-11-13 PROCEDURE — 87015 SPECIMEN INFECT AGNT CONCNTJ: CPT

## 2023-11-13 PROCEDURE — 36415 COLL VENOUS BLD VENIPUNCTURE: CPT

## 2023-11-13 PROCEDURE — 87116 MYCOBACTERIA CULTURE: CPT

## 2023-11-15 LAB
IGA SERPL-MCNC: 248 MG/DL (ref 68–408)
IGE SERPL-ACNC: 295 KU/L
IGG SERPL-MCNC: 795 MG/DL (ref 768–1632)
IGM SERPL-MCNC: 158 MG/DL (ref 35–263)

## 2023-11-20 ENCOUNTER — NON-PROVIDER VISIT (OUTPATIENT)
Dept: SLEEP MEDICINE | Facility: MEDICAL CENTER | Age: 78
End: 2023-11-20
Attending: INTERNAL MEDICINE
Payer: COMMERCIAL

## 2023-11-20 VITALS — BODY MASS INDEX: 20.16 KG/M2 | WEIGHT: 100 LBS | HEIGHT: 59 IN

## 2023-11-20 DIAGNOSIS — R93.89 ABNORMAL CT OF THE CHEST: ICD-10-CM

## 2023-11-20 DIAGNOSIS — J47.9 BRONCHIECTASIS WITHOUT COMPLICATION (HCC): ICD-10-CM

## 2023-11-20 PROCEDURE — 94060 EVALUATION OF WHEEZING: CPT | Performed by: INTERNAL MEDICINE

## 2023-11-20 PROCEDURE — 94729 DIFFUSING CAPACITY: CPT | Performed by: INTERNAL MEDICINE

## 2023-11-20 PROCEDURE — 94729 DIFFUSING CAPACITY: CPT | Mod: 26 | Performed by: INTERNAL MEDICINE

## 2023-11-20 PROCEDURE — 94726 PLETHYSMOGRAPHY LUNG VOLUMES: CPT | Mod: 26 | Performed by: INTERNAL MEDICINE

## 2023-11-20 PROCEDURE — 94060 EVALUATION OF WHEEZING: CPT | Mod: 26 | Performed by: INTERNAL MEDICINE

## 2023-11-20 PROCEDURE — 94726 PLETHYSMOGRAPHY LUNG VOLUMES: CPT | Performed by: INTERNAL MEDICINE

## 2023-11-20 ASSESSMENT — PULMONARY FUNCTION TESTS
FEV1/FVC: 68.89
FEV1/FVC_PERCENT_PREDICTED: 88
FEV1_PERCENT_PREDICTED: 72
FEV1/FVC: 67
FVC_PREDICTED: 2.22
FEV1/FVC: 69
FEV1/FVC: 67
FVC_PERCENT_PREDICTED: 81
FEV1/FVC_PERCENT_PREDICTED: 86
FEV1/FVC_PERCENT_CHANGE: 113
FVC: 1.61
FEV1/FVC_PERCENT_PREDICTED: 89
FEV1_PREDICTED: 1.72
FEV1_LLN: 1.43
FEV1: 1.08
FEV1_PERCENT_CHANGE: 9
FEV1_PERCENT_PREDICTED: 62
FEV1/FVC_PERCENT_PREDICTED: 85
FEV1_PERCENT_CHANGE: 8
FVC_PERCENT_PREDICTED: 72
FEV1/FVC_PERCENT_LLN: 65
FEV1: 1.24
FEV1/FVC_PERCENT_CHANGE: 2
FVC: 1.8
FEV1/FVC_PREDICTED: 78
FEV1/FVC_PERCENT_PREDICTED: 77
FVC_LLN: 1.85

## 2023-11-20 NOTE — PROCEDURES
Technician: Rizwana Tolentino RRT, CPFT  Good patient effort & cooperation.  The results of this test meet the ATS/ERS standards for acceptability & reproducibility.  Test was performed on the Easel Learn Body Plethysmograph-Elite DX system.  Predicted equations for Spirometry are GLI-2012, ITS for lung volumes, and GLI-2017 for DLCO.  The DLCO was uncorrected for Hgb.  A bronchodilator of Ventolin HFA -2puffs via spacer administered.  DLCO performed during dilation period.  Patient C/O being claustrophobic, but was able to perform the Pleth maneuver without incident. IVC is less than 90%.    Interpretation;   Baseline spirometry shows airflow obstruction with an FEV1/FVC ratio of 67 and an FEV1 of 1.08 L or 62% predicted.  There is a trend toward bronchodilator response but does not meet ATS criteria.  Postbronchodilator FEV1 is 1.24 L or 72% predicted.  Total lung capacity is just above the upper limits of normal and there is evidence for air trapping.  Diffusion capacity is normal.  Pulmonary function testing shows airflow obstruction with hyperinflation, air trapping and preserved DLCO.  There is trend toward bronchodilator responsiveness.  Findings could be consistent with stated diagnosis of bronchiectasis or uncontrolled asthma.  Correlate clinically.

## 2023-11-27 ENCOUNTER — TELEPHONE (OUTPATIENT)
Dept: SLEEP MEDICINE | Facility: MEDICAL CENTER | Age: 78
End: 2023-11-27
Payer: COMMERCIAL

## 2023-11-27 NOTE — TELEPHONE ENCOUNTER
Kelsi from [facility] called re: dme order - they need a request for service form completed. Please call her at [phone] with any questions.

## 2023-11-27 NOTE — TELEPHONE ENCOUNTER
Kelsi from [facility] calling back after being previously disconnected. States paperwork is needed for patient. [facility] orders, request for services form, and most recent office visits notes be faxed to [phone]. States she has made multiple attempts to contact.   Please call Kelsi at [facility] at [phone] if there are any questions.     Please review and advise.

## 2023-12-04 ENCOUNTER — HOSPITAL ENCOUNTER (OUTPATIENT)
Dept: LAB | Facility: MEDICAL CENTER | Age: 78
End: 2023-12-04
Attending: INTERNAL MEDICINE
Payer: COMMERCIAL

## 2023-12-04 DIAGNOSIS — R93.89 ABNORMAL CT OF THE CHEST: ICD-10-CM

## 2023-12-04 DIAGNOSIS — J47.9 BRONCHIECTASIS WITHOUT COMPLICATION (HCC): ICD-10-CM

## 2023-12-04 LAB — CHLORIDE SWEAT-SCNC: 18 MMOL/L (ref 0–29)

## 2023-12-04 PROCEDURE — 82438 ASSAY OTHER FLUID CHLORIDES: CPT

## 2023-12-04 PROCEDURE — 89230 COLLECT SWEAT FOR TEST: CPT

## 2023-12-18 LAB
MYCOBACTERIUM SPEC CULT: ABNORMAL
RHODAMINE-AURAMINE STN SPEC: ABNORMAL
RHODAMINE-AURAMINE STN SPEC: ABNORMAL
SIGNIFICANT IND 70042: ABNORMAL
SIGNIFICANT IND 70042: ABNORMAL
SITE SITE: ABNORMAL
SITE SITE: ABNORMAL
SOURCE SOURCE: ABNORMAL
SOURCE SOURCE: ABNORMAL

## 2023-12-26 LAB
MYCOBACTERIUM SPEC CULT: ABNORMAL
MYCOBACTERIUM SPEC CULT: ABNORMAL
RHODAMINE-AURAMINE STN SPEC: ABNORMAL
SIGNIFICANT IND 70042: ABNORMAL
SITE SITE: ABNORMAL
SOURCE SOURCE: ABNORMAL

## 2024-03-13 ENCOUNTER — OFFICE VISIT (OUTPATIENT)
Dept: SLEEP MEDICINE | Facility: MEDICAL CENTER | Age: 79
End: 2024-03-13
Attending: INTERNAL MEDICINE
Payer: COMMERCIAL

## 2024-03-13 VITALS
HEIGHT: 59 IN | WEIGHT: 104 LBS | DIASTOLIC BLOOD PRESSURE: 70 MMHG | SYSTOLIC BLOOD PRESSURE: 120 MMHG | HEART RATE: 68 BPM | BODY MASS INDEX: 20.96 KG/M2 | OXYGEN SATURATION: 96 %

## 2024-03-13 DIAGNOSIS — T78.40XS ALLERGY, SEQUELA: ICD-10-CM

## 2024-03-13 DIAGNOSIS — J47.9 BRONCHIECTASIS WITHOUT COMPLICATION (HCC): ICD-10-CM

## 2024-03-13 DIAGNOSIS — J44.9 OBSTRUCTIVE LUNG DISEASE (GENERALIZED) (HCC): ICD-10-CM

## 2024-03-13 DIAGNOSIS — R93.89 ABNORMAL CT OF THE CHEST: ICD-10-CM

## 2024-03-13 DIAGNOSIS — A31.9 MYCOBACTERIA, ATYPICAL: ICD-10-CM

## 2024-03-13 PROCEDURE — 3078F DIAST BP <80 MM HG: CPT | Performed by: INTERNAL MEDICINE

## 2024-03-13 PROCEDURE — 3074F SYST BP LT 130 MM HG: CPT | Performed by: INTERNAL MEDICINE

## 2024-03-13 PROCEDURE — 99212 OFFICE O/P EST SF 10 MIN: CPT | Performed by: INTERNAL MEDICINE

## 2024-03-13 PROCEDURE — 99214 OFFICE O/P EST MOD 30 MIN: CPT | Performed by: INTERNAL MEDICINE

## 2024-03-13 RX ORDER — FLUTICASONE PROPIONATE 44 UG/1
2 AEROSOL, METERED RESPIRATORY (INHALATION) 2 TIMES DAILY
COMMUNITY

## 2024-03-13 ASSESSMENT — ENCOUNTER SYMPTOMS
DEPRESSION: 0
FOCAL WEAKNESS: 0
CLAUDICATION: 0
MYALGIAS: 0
SPUTUM PRODUCTION: 0
HEADACHES: 0
EYE REDNESS: 0
SHORTNESS OF BREATH: 0
BLURRED VISION: 0
STRIDOR: 0
VOMITING: 0
CHILLS: 0
PHOTOPHOBIA: 0
DIZZINESS: 0
HEMOPTYSIS: 0
TREMORS: 0
PALPITATIONS: 0
ABDOMINAL PAIN: 0
WEIGHT LOSS: 0
ORTHOPNEA: 0
WHEEZING: 0
FEVER: 0
SORE THROAT: 0
FALLS: 0
CONSTIPATION: 0
EYE PAIN: 0
NAUSEA: 0
SPEECH CHANGE: 0
DIARRHEA: 0
WEAKNESS: 0
DOUBLE VISION: 0
COUGH: 1
EYE DISCHARGE: 0
HEARTBURN: 0
NECK PAIN: 0
SINUS PAIN: 0
BACK PAIN: 0
PND: 0
DIAPHORESIS: 0

## 2024-03-13 ASSESSMENT — PATIENT HEALTH QUESTIONNAIRE - PHQ9: CLINICAL INTERPRETATION OF PHQ2 SCORE: 0

## 2024-03-13 NOTE — PROGRESS NOTES
Chief Complaint   Patient presents with    Follow-Up     LAST SEEN 11/8/23    Results     PFT 11/20/23  LABS 11/9/23, 11/10/23, 11/13/23,12/4/23         HPI: This patient is a 78 y.o. female whom is followed in our clinic for chronic cough last seen by me on 11/8/23 for initial consultation. 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 She smoked socially but a total of 4 packs total in 20 years, never a daily smoker and tobacco free for 30 years. She has a daughter with severe asthma. No family hx of autoimmune disease or CF. She has no pets at home. She was last hospitalized for severe pna in 2009. Per pt she had no pulmonary complaints for 5-6 years until 7/2022 when [facility]epigastric [facility]. CT abdomen showed some RLL GGO/tree-in-bud inflammatory changes which pt states has been abnormal for many years and often thought to be the source of her pna. She was tx with abx for possible pna although denies respiratory complaints at the time. She was referred to pulmonary and [facility] who she says started her on NAC orally. She also apparently had sputum clx x2 that showed no pathologic bacteria, not clear if were held for AFB. She stopped the NAC after 3 mos due to side effects of new cough and airway dryness then in 1/2023 she developed severe cough with copious green sputum which has persisted until her first visit with me in November. She had been seen in ED on multiple occasions for upper chest pain which is similar to prior episodes of pna in the past. She also reported 20-25 lb wt loss over the 16 mos. CT chest from 5/2023 viewed by me showed tree-in-bud inflammatory changes with some larger nodules in RUL, RML, RLL and DANIEL. No cavitary lesions. No LAD. I was concerned for NTM and we collected sputum x3 which were positive x3 for MAC by DNA probe, smear negative.  Her immunoglobulin levels were normal other than mildly elevated IgG. She has seen allergy and noted to have signficant environmental allergies but IT was not recommended. She was started on flovent 44 and albuterol both scheduled BID. We planned to start airway clearance with daily nebulized albuterol and flutter valve. sHe is using albuterol nebulizer but not daily and never [facility]. Overall her cough has improved significantly. She has seen speech therapy and GI both for her cough. She has declined EGD given she denies GERD. No abnormal issues with swalllowing. PFT from November showed air flow obstruction with + BD response, mild hyperinflation with mild air trapping and normal DLCO. Weight is stable for the last 4 mos.     No past medical history on file.    Social History     Socioeconomic History    Marital status: Single     Spouse name: Not on file    Number of children: Not on file    Years of education: Not on file    Highest education level: Not on file   Occupational History    Not on file   Tobacco Use    Smoking status: Never    Smokeless tobacco: Never   Substance and Sexual Activity    Alcohol use: Not on file    Drug use: Not on file    Sexual activity: Not on file   Other Topics Concern    Not on file   Social History Narrative    Not on file     Social Determinants of Health     Financial Resource Strain: Not on file   Food Insecurity: Not on file   Transportation Needs: Not on file   Physical Activity: Not on file   Stress: Not on file   Social Connections: Not on file   Intimate Partner Violence: Not on file   Housing Stability: Not on file       No family history on file.    Current Outpatient Medications on File Prior to Visit   Medication Sig Dispense Refill    fluticasone (FLOVENT HFA) 44 MCG/ACT Aerosol Inhale 2 Puffs 2 times a day.      albuterol 108 (90 Base) MCG/ACT Aero Soln inhalation aerosol Inhale 2 Puffs every four hours as needed.      albuterol (PROVENTIL)  "2.5mg/3ml Nebu Soln solution for nebulization Take 3 mL by nebulization every four hours as needed for Shortness of Breath (airway clearance). 120 mL 11     No current facility-administered medications on file prior to visit.       Codeine, Diagnostic x-ray materials, Erythromycin, and Moxifloxacin      ROS:   Review of Systems   Constitutional:  Negative for chills, diaphoresis, fever, malaise/fatigue and weight loss.   HENT:  Negative for congestion, ear discharge, ear pain, hearing loss, nosebleeds, sinus pain, sore throat and tinnitus.    Eyes:  Negative for blurred vision, double vision, photophobia, pain, discharge and redness.   Respiratory:  Positive for cough. Negative for hemoptysis, sputum production, shortness of breath, wheezing and stridor.    Cardiovascular:  Negative for chest pain, palpitations, orthopnea, claudication, leg swelling and PND.   Gastrointestinal:  Negative for abdominal pain, constipation, diarrhea, heartburn, nausea and vomiting.   Genitourinary:  Negative for dysuria and urgency.   Musculoskeletal:  Negative for back pain, falls, joint pain, myalgias and neck pain.   Skin:  Negative for itching and rash.   Neurological:  Negative for dizziness, tremors, speech change, focal weakness, weakness and headaches.   Endo/Heme/Allergies:  Negative for environmental allergies.   Psychiatric/Behavioral:  Negative for depression.        /70 (BP Location: Right arm, Patient Position: Sitting, BP Cuff Size: Adult)   Pulse 68   Ht 1.499 m (4' 11\")   Wt 47.2 kg (104 lb)   SpO2 96%   Physical Exam  Constitutional:       General: She is not in acute distress.     Appearance: Normal appearance. She is well-developed and normal weight.   HENT:      Head: Normocephalic and atraumatic.      Right Ear: External ear normal.      Left Ear: External ear normal.      Nose: Nose normal. No congestion.      Mouth/Throat:      Mouth: Mucous membranes are moist.      Pharynx: Oropharynx is clear. No " oropharyngeal exudate.   Eyes:      General: No scleral icterus.     Extraocular Movements: Extraocular movements intact.      Conjunctiva/sclera: Conjunctivae normal.      Pupils: Pupils are equal, round, and reactive to light.   Neck:      Vascular: No JVD.      Trachea: No tracheal deviation.   Cardiovascular:      Rate and Rhythm: Normal rate and regular rhythm.      Heart sounds: Normal heart sounds. No murmur heard.     No friction rub. No gallop.   Pulmonary:      Effort: Pulmonary effort is normal. No accessory muscle usage or respiratory distress.      Breath sounds: Normal breath sounds. No wheezing or rales.   Abdominal:      General: There is no distension.      Palpations: Abdomen is soft.      Tenderness: There is no abdominal tenderness.   Musculoskeletal:         General: No tenderness or deformity. Normal range of motion.      Cervical back: Normal range of motion and neck supple.      Right lower leg: No edema.      Left lower leg: No edema.   Lymphadenopathy:      Cervical: No cervical adenopathy.   Skin:     General: Skin is warm and dry.      Findings: No rash.      Nails: There is no clubbing.   Neurological:      Mental Status: She is alert and oriented to person, place, and time.      Cranial Nerves: No cranial nerve deficit.      Gait: Gait normal.   Psychiatric:         Behavior: Behavior normal.       PFTs as reviewed by me personally: as per hPI    Imaging as reviewed by me personally:  as per hPI    Assessment:  1. Bronchiectasis without complication (HCC)  CT-CHEST ([facility]) W/O      2. Abnormal CT of the chest  CT-CHEST ([facility]) W/O      3. Obstructive lung disease (generalized) (HCC)        4. Allergy, sequela        5. Mycobacteria, atypical  CT-CHEST ([facility]) W/O          Plan:  This is mild on CT but the more abnormal finding is the inflammatory changes likely secondary to mycobacterial disease.  She has seen significant improvement in symptoms with treatment for obstructive lung  disease to allergy.  We did discuss treatment of mycobacterial disease but opted to continue observation with repeat CT and repeat symptom assessment in the next 4 months.  This is in part due to sensitivities to medications and the requirement for 12 to 18 months minimum of 3 antibiotics for eradication of mycobacterial disease.  Patient will see if she can get a flutter valve in the meantime to help with airway clearance and continue nebulized bronchodilators for any increase in chest congestion.  See discussion above.  Likely secondary to mycobacterial disease.  No clear underlying immune issue.  Repeat CT in the next 3 to 6 months.  Presumably secondary to mild bronchiectasis and asthma.  She does have underlying allergies.  She is benefiting from Flovent and albuterol.  No changes to this regimen.  Has seen allergy.  No immunotherapy was recommended.  Overall per patient symptoms are mild.  Patient's is presenting with fairly classic symptoms of mycobacteria and I suspect this in combination with allergies and obstructive lung disease is the reason for her cough.  She tells me cough is markedly improved with decrease sputum production and stabilized weight.  Week discussed continued observation for the next 4 to 6 months.  If any ongoing weight loss or increasing respiratory symptoms I would recommend referral to our mycobacterial clinic or infectious disease.  Follow-up CT to evaluate for disease progression.  Return in about 5 months (around 8/13/2024) for CT chest between May and August.

## 2024-07-22 ENCOUNTER — TELEPHONE (OUTPATIENT)
Dept: SLEEP MEDICINE | Facility: MEDICAL CENTER | Age: 79
End: 2024-07-22
Payer: COMMERCIAL

## 2024-08-26 ENCOUNTER — APPOINTMENT (OUTPATIENT)
Dept: RADIOLOGY | Facility: MEDICAL CENTER | Age: 79
End: 2024-08-26
Attending: INTERNAL MEDICINE
Payer: COMMERCIAL

## 2024-08-26 DIAGNOSIS — A31.9 MYCOBACTERIA, ATYPICAL: ICD-10-CM

## 2024-08-26 DIAGNOSIS — J47.9 BRONCHIECTASIS WITHOUT COMPLICATION (HCC): ICD-10-CM

## 2024-08-26 DIAGNOSIS — R93.89 ABNORMAL CT OF THE CHEST: ICD-10-CM

## 2024-08-26 PROCEDURE — 71250 CT THORAX DX C-: CPT

## 2024-09-12 ENCOUNTER — OFFICE VISIT (OUTPATIENT)
Dept: SLEEP MEDICINE | Facility: MEDICAL CENTER | Age: 79
End: 2024-09-12
Attending: INTERNAL MEDICINE
Payer: COMMERCIAL

## 2024-09-12 VITALS
HEART RATE: 72 BPM | SYSTOLIC BLOOD PRESSURE: 114 MMHG | BODY MASS INDEX: 20.96 KG/M2 | HEIGHT: 59 IN | WEIGHT: 104 LBS | DIASTOLIC BLOOD PRESSURE: 60 MMHG | OXYGEN SATURATION: 97 %

## 2024-09-12 DIAGNOSIS — R93.89 ABNORMAL CT OF THE CHEST: ICD-10-CM

## 2024-09-12 DIAGNOSIS — T78.40XS ALLERGY, SEQUELA: ICD-10-CM

## 2024-09-12 DIAGNOSIS — A31.9 MYCOBACTERIA, ATYPICAL: ICD-10-CM

## 2024-09-12 DIAGNOSIS — J47.9 BRONCHIECTASIS WITHOUT COMPLICATION (HCC): ICD-10-CM

## 2024-09-12 DIAGNOSIS — J44.9 OBSTRUCTIVE LUNG DISEASE (GENERALIZED) (HCC): ICD-10-CM

## 2024-09-12 PROCEDURE — 3074F SYST BP LT 130 MM HG: CPT | Performed by: INTERNAL MEDICINE

## 2024-09-12 PROCEDURE — 3078F DIAST BP <80 MM HG: CPT | Performed by: INTERNAL MEDICINE

## 2024-09-12 PROCEDURE — 99211 OFF/OP EST MAY X REQ PHY/QHP: CPT | Performed by: INTERNAL MEDICINE

## 2024-09-12 PROCEDURE — 99214 OFFICE O/P EST MOD 30 MIN: CPT | Performed by: INTERNAL MEDICINE

## 2024-09-12 ASSESSMENT — ENCOUNTER SYMPTOMS
CONSTIPATION: 0
BLURRED VISION: 0
EYE DISCHARGE: 0
VOMITING: 0
CLAUDICATION: 0
ORTHOPNEA: 0
HEMOPTYSIS: 0
PND: 0
SPUTUM PRODUCTION: 0
CHILLS: 0
DIARRHEA: 0
BACK PAIN: 0
NECK PAIN: 0
EYE PAIN: 0
TREMORS: 0
SINUS PAIN: 0
PHOTOPHOBIA: 0
DIAPHORESIS: 0
EYE REDNESS: 0
SHORTNESS OF BREATH: 0
WHEEZING: 0
WEAKNESS: 0
PALPITATIONS: 0
WEIGHT LOSS: 0
DEPRESSION: 0
FALLS: 0
COUGH: 0
STRIDOR: 0
MYALGIAS: 0
HEADACHES: 0
HEARTBURN: 0
SORE THROAT: 0
ABDOMINAL PAIN: 0
NAUSEA: 0
DIZZINESS: 0
FEVER: 0
FOCAL WEAKNESS: 0
SPEECH CHANGE: 0
DOUBLE VISION: 0

## 2024-09-12 NOTE — PROGRESS NOTES
Chief Complaint   Patient presents with    Follow-Up     Last seen: Bronchiectasis. Last seen 03/13/24      Results     Ct-Chest 08/26/24           HPI: This patient is a 78 y.o. female whom is followed in our clinic for bronchiectasis with associated NTM last seen by me on 3/13/24.  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 She smoked socially but a total of 4 packs total in 20 years, never a daily smoker and tobacco free for 30 years. She has a daughter with severe asthma. No family hx of autoimmune disease or CF. She has no pets at home. She was last hospitalized for severe pna in 2009. Per pt she had no pulmonary complaints for 5-6 years until 7/2022 [facility]epigastric [facility]. CT abdomen showed some RLL GGO/tree-in-bud inflammatory changes which pt states has been abnormal for many years and often thought to be the source of her pna. She was tx with abx for possible pna although denies respiratory complaints at the time. She was referred to pulmonary and [facility] who she says started her on NAC orally. She also apparently had sputum clx x2 that showed no pathologic bacteria, not clear if were held for AFB. She stopped the NAC after 3 mos due to side effects of new cough and airway dryness then in 1/2023 she developed severe cough with copious green sputum which has persisted until her first visit with me in November 2023.  CT chest from 5/2023 viewed by me showed tree-in-bud inflammatory changes with some larger nodules in RUL, RML, RLL and DANIEL. No cavitary lesions. No LAD. I was concerned for NTM and we collected sputum x3 which were positive x3 for MAC by DNA probe, smear negative. Her immunoglobulin levels were normal other than mildly elevated IgG. She has seen allergy and noted to have signficant environmental allergies but IT was not recommended. She  was started on flovent 44 and albuterol both scheduled BID. We planned to start airway clearance with daily nebulized albuterol and flutter valve. sHe is using albuterol nebulizer but not daily and [facility]. Overall her cough has improved significantly. She has seen speech therapy and GI both for her cough. She has declined EGD given she denies GERD. No abnormal issues with swalllowing. PFT from November showed air flow obstruction with + BD response, mild hyperinflation with mild air trapping and normal DLCO. Weight is stable although she does report a 20 lb weight loss last year. Repeat CT from 8/26 showed resolution of some nodular infiltrates on the right but several new areas of nodular consolidation with associated GGO inferior DANIEL and superior LLL. No LAD. She feels well. Cough is actually improved with airway clearance.     Past Medical History:   Diagnosis Date    Cough        Social History     Socioeconomic History    Marital status: Single     Spouse name: Not on file    Number of children: Not on file    Years of education: Not on file    Highest education level: Not on file   Occupational History    Not on file   Tobacco Use    Smoking status: Never    Smokeless tobacco: Never   Substance and Sexual Activity    Alcohol use: Not on file    Drug use: Not on file    Sexual activity: Not on file   Other Topics Concern    Not on file   Social History Narrative    Not on file     Social Determinants of Health     Financial Resource Strain: Not on file   Food Insecurity: Not on file   Transportation Needs: Not on file   Physical Activity: Not on file   Stress: Not on file   Social Connections: Not on file   Intimate Partner Violence: Not on file   Housing Stability: Not on file       No family history on file.    Current Outpatient Medications on File Prior to Visit   Medication Sig Dispense Refill    albuterol 108 (90 Base) MCG/ACT Aero Soln inhalation aerosol Inhale 2 Puffs every four  "hours as needed.      albuterol (PROVENTIL) 2.5mg/3ml Nebu Soln solution for nebulization Take 3 mL by nebulization every four hours as needed for Shortness of Breath (airway clearance). 120 mL 11     No current facility-administered medications on file prior to visit.       Codeine, Diagnostic x-ray materials, Erythromycin, and Moxifloxacin      ROS:   Review of Systems   Constitutional:  Negative for chills, diaphoresis, fever, malaise/fatigue and weight loss.   HENT:  Negative for congestion, ear discharge, ear pain, hearing loss, nosebleeds, sinus pain, sore throat and tinnitus.    Eyes:  Negative for blurred vision, double vision, photophobia, pain, discharge and redness.   Respiratory:  Negative for cough, hemoptysis, sputum production, shortness of breath, wheezing and stridor.    Cardiovascular:  Negative for chest pain, palpitations, orthopnea, claudication, leg swelling and PND.   Gastrointestinal:  Negative for abdominal pain, constipation, diarrhea, heartburn, nausea and vomiting.   Genitourinary:  Negative for dysuria and urgency.   Musculoskeletal:  Negative for back pain, falls, joint pain, myalgias and neck pain.   Skin:  Negative for itching and rash.   Neurological:  Negative for dizziness, tremors, speech change, focal weakness, weakness and headaches.   Endo/Heme/Allergies:  Negative for environmental allergies.   Psychiatric/Behavioral:  Negative for depression.        /60 (BP Location: Left arm, Patient Position: Sitting, BP Cuff Size: Adult)   Pulse 72   Ht 1.499 m (4' 11\")   Wt 47.2 kg (104 lb)   SpO2 97%   Physical Exam  Constitutional:       General: She is not in acute distress.     Appearance: Normal appearance. She is well-developed and normal weight.   HENT:      Head: Normocephalic and atraumatic.      Right Ear: External ear normal.      Left Ear: External ear normal.      Nose: Nose normal. No congestion.      Mouth/Throat:      Mouth: Mucous membranes are moist.      " Pharynx: Oropharynx is clear. No oropharyngeal exudate.   Eyes:      General: No scleral icterus.     Extraocular Movements: Extraocular movements intact.      Conjunctiva/sclera: Conjunctivae normal.      Pupils: Pupils are equal, round, and reactive to light.   Neck:      Vascular: No JVD.      Trachea: No tracheal deviation.   Cardiovascular:      Rate and Rhythm: Normal rate and regular rhythm.      Heart sounds: Normal heart sounds. No murmur heard.     No friction rub. No gallop.   Pulmonary:      Effort: Pulmonary effort is normal. No accessory muscle usage or respiratory distress.      Breath sounds: Normal breath sounds. No wheezing or rales.   Abdominal:      General: There is no distension.      Palpations: Abdomen is soft.      Tenderness: There is no abdominal tenderness.   Musculoskeletal:         General: No tenderness or deformity. Normal range of motion.      Cervical back: Normal range of motion and neck supple.      Right lower leg: No edema.      Left lower leg: No edema.   Lymphadenopathy:      Cervical: No cervical adenopathy.   Skin:     General: Skin is warm and dry.      Findings: No rash.      Nails: There is no clubbing.   Neurological:      Mental Status: She is alert and oriented to person, place, and time.      Cranial Nerves: No cranial nerve deficit.      Gait: Gait normal.   Psychiatric:         Behavior: Behavior normal.       PFTs as reviewed by me personally: as per hPI    Imaging as reviewed by me personally:  as per HPI    Assessment:  1. Bronchiectasis without complication (HCC)  PULMONARY FUNCTION TESTS -Test requested: Complete Pulmonary Function Test      2. Abnormal CT of the chest  PULMONARY FUNCTION TESTS -Test requested: Complete Pulmonary Function Test      3. Obstructive lung disease (generalized) (HCC)  PULMONARY FUNCTION TESTS -Test requested: Complete Pulmonary Function Test      4. Allergy, sequela        5. Mycobacteria, atypical            Plan:  Chronic but  currently stable with airway clearance. She is at least colonized with NTM with migratory infiltrates but not overall worse by sxs or imaging and no cavitary lesions. We opted to continue observation and regular airway clearance. Update PFT in 6 mos and CT in one year or sooner if sxs worsen.  See above.  Secondary to bronchiectasis and allergy. Sxs controlled on low dose ICS and prn INES  Chronic but controlled at present  Colonized. See above. Continue observation.  Return in about 6 months (around 3/12/2025) for PFT at time of f/u .

## 2024-09-12 NOTE — Clinical Note
Pt [facility] authorization will  prior to her next visit; can we initiate this before her next visit? She states it needs to come from our office. Thank you

## 2024-09-17 ENCOUNTER — TELEPHONE (OUTPATIENT)
Dept: SLEEP MEDICINE | Facility: MEDICAL CENTER | Age: 79
End: 2024-09-17
Payer: COMMERCIAL

## 2024-09-17 DIAGNOSIS — J47.9 BRONCHIECTASIS WITHOUT COMPLICATION (HCC): ICD-10-CM

## 2024-09-18 RX ORDER — AZITHROMYCIN 250 MG/1
TABLET, FILM COATED ORAL
Qty: 6 TABLET | Refills: 0 | Status: SHIPPED | OUTPATIENT
Start: 2024-09-18

## 2025-02-19 ENCOUNTER — NON-PROVIDER VISIT (OUTPATIENT)
Dept: SLEEP MEDICINE | Facility: MEDICAL CENTER | Age: 80
End: 2025-02-19
Attending: INTERNAL MEDICINE
Payer: COMMERCIAL

## 2025-02-19 VITALS — WEIGHT: 104 LBS | BODY MASS INDEX: 20.96 KG/M2 | HEIGHT: 59 IN

## 2025-02-19 DIAGNOSIS — R93.89 ABNORMAL CT OF THE CHEST: ICD-10-CM

## 2025-02-19 DIAGNOSIS — J44.9 OBSTRUCTIVE LUNG DISEASE (GENERALIZED) (HCC): ICD-10-CM

## 2025-02-19 DIAGNOSIS — J47.9 BRONCHIECTASIS WITHOUT COMPLICATION (HCC): ICD-10-CM

## 2025-02-19 PROCEDURE — 94060 EVALUATION OF WHEEZING: CPT | Mod: 26 | Performed by: INTERNAL MEDICINE

## 2025-02-19 PROCEDURE — 94729 DIFFUSING CAPACITY: CPT | Performed by: INTERNAL MEDICINE

## 2025-02-19 PROCEDURE — 94726 PLETHYSMOGRAPHY LUNG VOLUMES: CPT | Mod: 26 | Performed by: INTERNAL MEDICINE

## 2025-02-19 PROCEDURE — 94726 PLETHYSMOGRAPHY LUNG VOLUMES: CPT | Performed by: INTERNAL MEDICINE

## 2025-02-19 PROCEDURE — 94060 EVALUATION OF WHEEZING: CPT | Performed by: INTERNAL MEDICINE

## 2025-02-19 PROCEDURE — 94729 DIFFUSING CAPACITY: CPT | Mod: 26 | Performed by: INTERNAL MEDICINE

## 2025-02-19 NOTE — PROCEDURES
Technician: ALEXIS Chicas    Technician Comment:  Good patient effort & cooperation.  The results of this test meet the ATS/ERS standards for acceptability & reproducibility.  Test was performed on the [employer] Body Plethysmograph-Elite DX system.  Predicted values were GLI-2012 for spirometry, GLI-2020 for DLCO, ITS for Lung Volumes.  The DLCO was uncorrected for Hgb.  A bronchodilator of Albuterol HFA -2puffs via spacer administered.  DLCO performed during dilation period.    Interpretation:  Baseline spirometry shows air flow obstruction with FEV1/FVC of 65 and FEV1 of 1.10L or 64% predicted.   There is significant bronchodilator response with improvement in FEV1 to 1.27 L or 75% predicted.  Total lung capacity is normal at 4.92 L or 114% predicted however there is air trapping with a residual volume of 154% predicted.  Diffusion capacity is normal at 105% predicted.  Pulmonary function testing shows airflow obstruction with associated air trapping and preserved DLCO.  There is significant bronchodilator response.  Findings could be consistent with stated diagnosis of bronchiectasis.  Correlate clinically and with imaging.

## 2025-04-07 ENCOUNTER — OFFICE VISIT (OUTPATIENT)
Dept: SLEEP MEDICINE | Facility: MEDICAL CENTER | Age: 80
End: 2025-04-07
Attending: NURSE PRACTITIONER
Payer: COMMERCIAL

## 2025-04-07 VITALS
RESPIRATION RATE: 16 BRPM | BODY MASS INDEX: 20.66 KG/M2 | WEIGHT: 102.5 LBS | SYSTOLIC BLOOD PRESSURE: 124 MMHG | HEIGHT: 59 IN | OXYGEN SATURATION: 96 % | DIASTOLIC BLOOD PRESSURE: 64 MMHG | HEART RATE: 65 BPM

## 2025-04-07 DIAGNOSIS — J47.9 BRONCHIECTASIS WITHOUT COMPLICATION (HCC): ICD-10-CM

## 2025-04-07 DIAGNOSIS — Z78.9 NONSMOKER: ICD-10-CM

## 2025-04-07 PROCEDURE — 99214 OFFICE O/P EST MOD 30 MIN: CPT | Performed by: NURSE PRACTITIONER

## 2025-04-07 PROCEDURE — 99212 OFFICE O/P EST SF 10 MIN: CPT | Performed by: NURSE PRACTITIONER

## 2025-04-07 RX ORDER — DOXYCYCLINE 100 MG/1
100 CAPSULE ORAL 2 TIMES DAILY
Qty: 20 CAPSULE | Refills: 0 | Status: SHIPPED | OUTPATIENT
Start: 2025-04-07

## 2025-04-07 RX ORDER — FLUTICASONE PROPIONATE AND SALMETEROL 100; 50 UG/1; UG/1
1 POWDER RESPIRATORY (INHALATION) 2 TIMES DAILY
Qty: 60 EACH | Refills: 5 | Status: CANCELLED | OUTPATIENT
Start: 2025-04-07

## 2025-04-07 ASSESSMENT — PATIENT HEALTH QUESTIONNAIRE - PHQ9: CLINICAL INTERPRETATION OF PHQ2 SCORE: 0

## 2025-04-07 NOTE — PROGRESS NOTES
"Chief Complaint   Patient presents with    Follow-Up     Dx/Last seen: 09/12/2024 Taryn Brooks    Tests completed: PFT 02/19/2025    Trial on Medications: low dose ICS and prn INES         HPI:  Soraida Orlando is a 79 y.o. year old female here today for follow-up on bronchiectasis.  Last OV 9/12/24 with Dr. Brooks     PFT 2/19/25 FEV1 1.1L or 64%, ratio 65, TLC and DLCO normal. Reviewed with patient.    MMRC Grade: 0-1  Exacerbations this year: 0    Currently prescribed nebs/INES, flutter valve. Flovent 44mcg prn.    Interval Events:  4/7/25: patient reports doing well with improvement and stability in chronic cough. It is productive but less so than in the past. She is only using INES prn and does not have flutter valve. Rare nebulizer use. She notes GERD in evening when she lies down if she does not eat 2-3hrs prior to bedtime. No shortness of breath with ADLS. She was previously on Advair a very long time ago. She tries to avoid mediations/treatments unless absolutely necessary.    PULM HX:  Per Dr. 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 She smoked socially but a total of 4 packs total in 20 years, never a daily smoker and tobacco free for 30 years. She has a daughter with severe asthma. No family hx of autoimmune disease or CF. She has no pets at home. She was last hospitalized for severe pna in 2009. Per pt she had no pulmonary complaints for 5-6 years until 7/2022 when seen in ED at [facility] for subcostal/epigastric pain. CT abdomen showed some RLL GGO/tree-in-bud inflammatory changes which pt states has been abnormal for many years and often thought to be the source of her pna. She was tx with abx for possible pna although denies respiratory complaints at the time. She was referred to pulmonary and saw Dr Navarrete at the [facility] who she says started her on NAC orally. She also " "apparently had sputum clx x2 that showed no pathologic bacteria, not clear if were held for [employer]. She stopped the NAC after 3 mos due to side effects of new cough and airway dryness then in 1/2023 she developed severe cough with copious green sputum which has persisted until her first visit with me in November 2023.  CT chest from 5/2023 viewed by me showed tree-in-bud inflammatory changes with some larger nodules in RUL, RML, RLL and DANIEL. No cavitary lesions. No LAD. I was concerned for NTM and we collected sputum x3 which were positive x3 for MAC by DNA probe, smear negative. Her immunoglobulin levels were normal other than mildly elevated IgG. She has seen allergy and noted to have signficant environmental allergies but IT was not recommended. She was started on flovent 44 and albuterol both scheduled BID. We planned to start airway clearance with daily nebulized albuterol and flutter valve. sHe is using albuterol nebulizer but not daily and never received flutter from [facility]. Overall her cough has improved significantly. She has seen speech therapy and GI both for her cough. She has declined EGD given she denies GERD. No abnormal issues with swalllowing. PFT from November showed air flow obstruction with + BD response, mild hyperinflation with mild air trapping and normal DLCO. Weight is stable although she does report a 20 lb weight loss last year. Repeat CT from 8/26 showed resolution of some nodular infiltrates on the right but several new areas of nodular consolidation with associated GGO inferior DANIEL and superior LLL. No LAD. She feels well. Cough is actually improved with airway clearance. \"    ROS: As per HPI and otherwise negative if not stated.    Past Medical History:   Diagnosis Date    Cough        History reviewed. No pertinent surgical history.    History reviewed. No pertinent family history.    Social History     Socioeconomic History    Marital status: Single     Spouse name: Not on file    Number of " "children: Not on file    Years of education: Not on file    Highest education level: Not on file   Occupational History    Not on file   Tobacco Use    Smoking status: Never    Smokeless tobacco: Never   Vaping Use    Vaping status: Never Used   Substance and Sexual Activity    Alcohol use: Never    Drug use: Never    Sexual activity: Not on file   Other Topics Concern    Not on file   Social History Narrative    Not on file     Social Drivers of Health     Financial Resource Strain: Not on file   Food Insecurity: Not on file   Transportation Needs: Not on file   Physical Activity: Not on file   Stress: Not on file   Social Connections: Not on file   Intimate Partner Violence: Not on file   Housing Stability: Not on file       Allergies as of 04/07/2025 - Reviewed 04/07/2025   Allergen Reaction Noted    Codeine Rash 07/07/2005    Diagnostic x-ray materials Vomiting 01/29/2018    Erythromycin Nausea 07/07/2005    Moxifloxacin Rash 04/01/2011        Vitals:  /64 (BP Location: Left arm, Patient Position: Sitting, BP Cuff Size: Adult)   Pulse 65   Resp 16   Ht 1.499 m (4' 11\")   Wt 46.5 kg (102 lb 8 oz)   SpO2 96%     Current medications as of today   Current Outpatient Medications   Medication Sig Dispense Refill    azithromycin (ZITHROMAX) 250 MG Tab Take 2 tablets on day 1, then take 1 tablet a day for 4 days. 6 Tablet 0    albuterol 108 (90 Base) MCG/ACT Aero Soln inhalation aerosol Inhale 2 Puffs every four hours as needed.      albuterol (PROVENTIL) 2.5mg/3ml Nebu Soln solution for nebulization Take 3 mL by nebulization every four hours as needed for Shortness of Breath (airway clearance). 120 mL 11     No current facility-administered medications for this visit.         Physical Exam:   Gen:           Alert and oriented, No apparent distress. Mood and affect appropriate, normal interaction with examiner.  Eyes:          PERRL, EOM intact, sclere white, conjunctive moist.  Ears:          Not examined. "   Hearing:     Grossly intact.  Nose:          Normal, no lesions or deformities.  Dentition:    Not examined.   Oropharynx:   Not examined.   Mallampati Classification: Not examined.   Neck:        Supple, trachea midline, no masses.  Respiratory Effort: No intercostal retractions or use of accessory muscles.   Lung Auscultation:      Clear to auscultation bilaterally; no rales, rhonchi or wheezing.  CV:            Regular rate and rhythm. No murmurs, rubs or gallops.  Abd:           Not examined.   Lymphadenopathy: Not examined.   Gait and Station: Normal.  Digits and Nails: No clubbing, cyanosis, petechiae, or nodes.   Cranial Nerves: II-XII grossly intact.  Skin:        No rashes, lesions or ulcers noted.               Ext:           No cyanosis or edema.      Assessment:  1. Bronchiectasis without complication (HCC)        2. BMI 20.0-20.9, adult        3. Nonsmoker                 Immunizations:    Flu:recommend  Pneumovax 23:2015  Prevnar 13:2015  PCV 20: not due  COVID-19: recommend    Plan:  Bronchiectasis is clinically stable. Patient will benefit from Albuterol HFA prn with spacer and recommend starting flutter valve 5min twice daily. She may have family member or friend to help her purchase one online. PFT is stable but chronic mild obstructive lung disease noted but no SOB. Will continue to monitor. Recommend CT chest w/o contrast due 8/2025 for annual monitoring.   CT chest w/o due 8/2025  Encourage healthy diet and activity for conditioning.  Follow up in 4 mos with CT scan of chest with Dr. Brooks, sooner if needed.    Please note that this dictation was created using voice recognition software. I have made every reasonable attempt to correct obvious errors, but it is possible there are errors of grammar and possibly content that I did not discover before finalizing the note.

## 2025-04-07 NOTE — PATIENT INSTRUCTIONS
Obtain flutter valve to use 5min twice daily to help with mucous clearance to keep airways clear - check [employer]    Complete CT scan of chest in September and see Dr. Brooks after     Rx doxycycline to have for travel in case symptoms change